# Patient Record
Sex: MALE | Race: WHITE | URBAN - METROPOLITAN AREA
[De-identification: names, ages, dates, MRNs, and addresses within clinical notes are randomized per-mention and may not be internally consistent; named-entity substitution may affect disease eponyms.]

---

## 2019-03-20 ENCOUNTER — OFFICE VISIT (OUTPATIENT)
Dept: FAMILY MEDICINE CLINIC | Age: 84
End: 2019-03-20
Payer: MEDICARE

## 2019-03-20 ENCOUNTER — HOSPITAL ENCOUNTER (OUTPATIENT)
Age: 84
Setting detail: SPECIMEN
Discharge: HOME | End: 2019-03-20
Payer: MEDICARE

## 2019-03-20 VITALS
BODY MASS INDEX: 26.9 KG/M2 | SYSTOLIC BLOOD PRESSURE: 130 MMHG | TEMPERATURE: 97.3 F | HEIGHT: 73 IN | DIASTOLIC BLOOD PRESSURE: 74 MMHG | WEIGHT: 203 LBS | OXYGEN SATURATION: 98 % | HEART RATE: 48 BPM

## 2019-03-20 DIAGNOSIS — G20 PARKINSON'S DISEASE (HCC): ICD-10-CM

## 2019-03-20 DIAGNOSIS — I48.0 PAROXYSMAL ATRIAL FIBRILLATION (HCC): ICD-10-CM

## 2019-03-20 DIAGNOSIS — E78.5 HYPERLIPIDEMIA, UNSPECIFIED HYPERLIPIDEMIA TYPE: ICD-10-CM

## 2019-03-20 DIAGNOSIS — I25.10 CORONARY ARTERY DISEASE INVOLVING NATIVE HEART WITHOUT ANGINA PECTORIS, UNSPECIFIED VESSEL OR LESION TYPE: ICD-10-CM

## 2019-03-20 DIAGNOSIS — Z91.81 AT HIGH RISK FOR FALLS: ICD-10-CM

## 2019-03-20 DIAGNOSIS — G20 PARKINSON'S DISEASE (HCC): Primary | ICD-10-CM

## 2019-03-20 LAB
ABSOLUTE EOS #: 0.31 K/UL (ref 0–0.44)
ABSOLUTE IMMATURE GRANULOCYTE: 0.03 K/UL (ref 0–0.3)
ABSOLUTE LYMPH #: 1.48 K/UL (ref 1.1–3.7)
ABSOLUTE MONO #: 0.63 K/UL (ref 0.1–1.2)
ALBUMIN SERPL-MCNC: 4.4 G/DL (ref 3.5–5.2)
ALBUMIN/GLOBULIN RATIO: 1.6 (ref 1–2.5)
ALP BLD-CCNC: 105 U/L (ref 40–129)
ALT SERPL-CCNC: <5 U/L (ref 5–41)
ANION GAP SERPL CALCULATED.3IONS-SCNC: 14 MMOL/L (ref 9–17)
AST SERPL-CCNC: 16 U/L
BASOPHILS # BLD: 1 % (ref 0–2)
BASOPHILS ABSOLUTE: 0.04 K/UL (ref 0–0.2)
BILIRUB SERPL-MCNC: 0.95 MG/DL (ref 0.3–1.2)
BUN BLDV-MCNC: 22 MG/DL (ref 8–23)
BUN/CREAT BLD: ABNORMAL (ref 9–20)
CALCIUM SERPL-MCNC: 9.2 MG/DL (ref 8.6–10.4)
CHLORIDE BLD-SCNC: 107 MMOL/L (ref 98–107)
CHOLESTEROL/HDL RATIO: 2.6
CHOLESTEROL: 98 MG/DL
CO2: 24 MMOL/L (ref 20–31)
CREAT SERPL-MCNC: 1.16 MG/DL (ref 0.7–1.2)
DIFFERENTIAL TYPE: ABNORMAL
EOSINOPHILS RELATIVE PERCENT: 4 % (ref 1–4)
GFR AFRICAN AMERICAN: >60 ML/MIN
GFR NON-AFRICAN AMERICAN: >60 ML/MIN
GFR SERPL CREATININE-BSD FRML MDRD: ABNORMAL ML/MIN/{1.73_M2}
GFR SERPL CREATININE-BSD FRML MDRD: ABNORMAL ML/MIN/{1.73_M2}
GLUCOSE BLD-MCNC: 89 MG/DL (ref 70–99)
HCT VFR BLD CALC: 45.3 % (ref 40.7–50.3)
HDLC SERPL-MCNC: 38 MG/DL
HEMOGLOBIN: 13.7 G/DL (ref 13–17)
IMMATURE GRANULOCYTES: 0 %
LDL CHOLESTEROL: 48 MG/DL (ref 0–130)
LYMPHOCYTES # BLD: 18 % (ref 24–43)
MCH RBC QN AUTO: 28.4 PG (ref 25.2–33.5)
MCHC RBC AUTO-ENTMCNC: 30.2 G/DL (ref 28.4–34.8)
MCV RBC AUTO: 93.8 FL (ref 82.6–102.9)
MONOCYTES # BLD: 8 % (ref 3–12)
NRBC AUTOMATED: 0 PER 100 WBC
PDW BLD-RTO: 14.7 % (ref 11.8–14.4)
PLATELET # BLD: 182 K/UL (ref 138–453)
PLATELET ESTIMATE: ABNORMAL
PMV BLD AUTO: 11.2 FL (ref 8.1–13.5)
POTASSIUM SERPL-SCNC: 4.7 MMOL/L (ref 3.7–5.3)
RBC # BLD: 4.83 M/UL (ref 4.21–5.77)
RBC # BLD: ABNORMAL 10*6/UL
SEG NEUTROPHILS: 69 % (ref 36–65)
SEGMENTED NEUTROPHILS ABSOLUTE COUNT: 5.8 K/UL (ref 1.5–8.1)
SODIUM BLD-SCNC: 145 MMOL/L (ref 135–144)
TOTAL PROTEIN: 7.2 G/DL (ref 6.4–8.3)
TRIGL SERPL-MCNC: 61 MG/DL
VLDLC SERPL CALC-MCNC: ABNORMAL MG/DL (ref 1–30)
WBC # BLD: 8.3 K/UL (ref 3.5–11.3)
WBC # BLD: ABNORMAL 10*3/UL

## 2019-03-20 PROCEDURE — 99204 OFFICE O/P NEW MOD 45 MIN: CPT | Performed by: PHYSICIAN ASSISTANT

## 2019-03-20 RX ORDER — FLUTICASONE PROPIONATE 50 MCG
2 SPRAY, SUSPENSION (ML) NASAL DAILY
COMMUNITY

## 2019-03-20 RX ORDER — HYDROXYCHLOROQUINE SULFATE 200 MG/1
TABLET, FILM COATED ORAL 2 TIMES DAILY
COMMUNITY
End: 2019-07-11 | Stop reason: SDUPTHER

## 2019-03-20 RX ORDER — ACETAMINOPHEN 325 MG/1
650 TABLET ORAL EVERY 6 HOURS PRN
COMMUNITY

## 2019-03-20 RX ORDER — TRIAMCINOLONE ACETONIDE 0.25 MG/ML
LOTION TOPICAL DAILY
COMMUNITY

## 2019-03-20 RX ORDER — QUETIAPINE FUMARATE 25 MG/1
75 TABLET, FILM COATED ORAL NIGHTLY
COMMUNITY

## 2019-03-20 RX ORDER — FEXOFENADINE HCL 180 MG/1
180 TABLET ORAL DAILY
COMMUNITY

## 2019-03-20 RX ORDER — SIMVASTATIN 20 MG
20 TABLET ORAL NIGHTLY
COMMUNITY
End: 2019-05-01 | Stop reason: SDUPTHER

## 2019-03-20 ASSESSMENT — ENCOUNTER SYMPTOMS
NAUSEA: 0
CONSTIPATION: 0
VOMITING: 0
BACK PAIN: 0
SHORTNESS OF BREATH: 0
ABDOMINAL PAIN: 0
DIARRHEA: 0
COUGH: 0
COLOR CHANGE: 0
WHEEZING: 0

## 2019-03-20 ASSESSMENT — PATIENT HEALTH QUESTIONNAIRE - PHQ9
SUM OF ALL RESPONSES TO PHQ QUESTIONS 1-9: 0
1. LITTLE INTEREST OR PLEASURE IN DOING THINGS: 0
SUM OF ALL RESPONSES TO PHQ9 QUESTIONS 1 & 2: 0
SUM OF ALL RESPONSES TO PHQ QUESTIONS 1-9: 0
2. FEELING DOWN, DEPRESSED OR HOPELESS: 0

## 2019-04-01 RX ORDER — NYSTATIN 100000 [USP'U]/G
POWDER TOPICAL
Qty: 60 G | Refills: 1 | Status: SHIPPED | OUTPATIENT
Start: 2019-04-01

## 2019-04-01 NOTE — TELEPHONE ENCOUNTER
Pt's wife is asking if you can send in a nystatin powder, pt has a rash on his leg.  Please advise thank you

## 2019-04-04 ENCOUNTER — OFFICE VISIT (OUTPATIENT)
Dept: NEUROLOGY | Age: 84
End: 2019-04-04
Payer: MEDICARE

## 2019-04-04 VITALS
SYSTOLIC BLOOD PRESSURE: 119 MMHG | HEIGHT: 73 IN | HEART RATE: 70 BPM | WEIGHT: 200 LBS | BODY MASS INDEX: 26.51 KG/M2 | DIASTOLIC BLOOD PRESSURE: 77 MMHG

## 2019-04-04 DIAGNOSIS — G20 PD (PARKINSON'S DISEASE) (HCC): Primary | ICD-10-CM

## 2019-04-04 DIAGNOSIS — G62.9 PERIPHERAL POLYNEUROPATHY: ICD-10-CM

## 2019-04-04 DIAGNOSIS — R44.3 HALLUCINATION: ICD-10-CM

## 2019-04-04 PROCEDURE — G8599 NO ASA/ANTIPLAT THER USE RNG: HCPCS | Performed by: PSYCHIATRY & NEUROLOGY

## 2019-04-04 PROCEDURE — 99204 OFFICE O/P NEW MOD 45 MIN: CPT | Performed by: PSYCHIATRY & NEUROLOGY

## 2019-04-04 PROCEDURE — G8419 CALC BMI OUT NRM PARAM NOF/U: HCPCS | Performed by: PSYCHIATRY & NEUROLOGY

## 2019-04-04 PROCEDURE — 4040F PNEUMOC VAC/ADMIN/RCVD: CPT | Performed by: PSYCHIATRY & NEUROLOGY

## 2019-04-04 PROCEDURE — G8427 DOCREV CUR MEDS BY ELIG CLIN: HCPCS | Performed by: PSYCHIATRY & NEUROLOGY

## 2019-04-04 PROCEDURE — 1123F ACP DISCUSS/DSCN MKR DOCD: CPT | Performed by: PSYCHIATRY & NEUROLOGY

## 2019-04-04 PROCEDURE — 1036F TOBACCO NON-USER: CPT | Performed by: PSYCHIATRY & NEUROLOGY

## 2019-04-04 NOTE — PROGRESS NOTES
[facility]            [facility], [address]; [place], [address]    [address] [facility], [address], [place], [address]            Dept: [phone]          Dept Fax: [phone]            MD Sophie Paz MD Ahmed B. Edilia Puna, MD Darleen Bernhardt, MD Dorlene Hector, MD Maria De Jesus Prieto, CNP                          NEUROLOGY NEW PATIENT NOTE       PATIENT NAME: Rafael Howard  PATIENT MRN: [id number]  PRIMARY CARE PHYSICIAN: Dr. Layla Sebastian, [facility]  REFERRED BY: Dr. Claude Haines       Dear Dr. Claude Haines    I had the pleasure of seeing your patient Rafael Howard, who comes to establish care with us. CHIEF COMPLAINTS: New Patient and Tremors         HPI:    Rafael Howard is a  80year old RH WM, who was referred to neurology for Parkinsons disease. Pt came with wife to clinic today. PD   Diagnosed about 5 years ago  Onset: 10 years ago  Features: no tremors, originally presented with voice change, gait issues with fall last Nov, stiffness when gets up in bed, hypophonia, tongue \"dancing\", smell is good,   needs assistance with dressing and some ADLs. He has visual (people, animals, bugs) and auditory (shower running) hallucinations, uses seriquel with help (now once in 1-2 months); uses a walker most of the time but wheelchair when outing  Risk factors: mother had PD. No history of head injury. Social:  since for 62 years. pt lives in [place] with wife since [address] [facility], they originally wanted to move permenantly, now wife has been thinking that permenant living here may not good for her  because he has no friend, no interest here. Wife recalls he used to thresh his hand during sleep. No kicking. Psychiatric: no depression/anxiety. Other medical issues: broke his right hip in last Nov after a fall, s/p surgery;   A fib that self converted; CAD with stent; on the plaquenil for? HLD on statin; hearing loss (not taking hearing aid anymore, \"not work\")    Medications: Sinemet 25/100mg (1.5 tablets TID) for the 5 years with some help.      NEUROLOGICAL WORKUP:   No head imaging    OTHER WORKUP:     Lab Results   Component Value Date    WBC 8.3 03/20/2019    HGB 13.7 03/20/2019    HCT 45.3 03/20/2019    MCV 93.8 03/20/2019     03/20/2019       PAST MEDICAL HISTORY:         Diagnosis Date    Allergic rhinitis     Anxiety     CAD (coronary artery disease)     with x 1 stent    Cancer ([facility]     prostate, treated in 2014, treated with radiation    Hallucinations     was treated by neurologist in [address] loss     Hip fracture ([facility] [facility]     Hyperlipidemia     EDGAR (obstructive sleep apnea)     declines use of CPAP    Osteoarthritis     Parkinson disease ([facility] [facility]     2016    Paroxysmal atrial fibrillation ([facility] [facility]     Urinary incontinence         PAST SURGICAL HISTORY:         Procedure Laterality Date    APPENDECTOMY      BACK SURGERY      for pinched nerve    CHOLECYSTECTOMY      COLONOSCOPY      HIP SURGERY Right     KNEE ARTHROPLASTY      partial knees bilaterally    KNEE SURGERY      TOTAL HIP ARTHROPLASTY          SOCIAL HISTORY:     Social History     Socioeconomic History    Marital status:      Spouse name: Not on file    Number of children: Not on file    Years of education: Not on file    Highest education level: Not on file   Occupational History    Not on file   Social Needs    Financial resource strain: Not on file    Food insecurity:     Worry: Not on file     Inability: Not on file    Transportation needs:     Medical: Not on file     Non-medical: Not on file   Tobacco Use    Smoking status: Never Smoker    Smokeless tobacco: Never Used   Substance and Sexual Activity    Alcohol use: Yes     Comment: rare    Drug use: Never    Sexual activity: Not on file   Lifestyle    Physical activity:     Days per week: Not on file     Minutes per session: Not on file    Stress: Not on file   Relationships    Social connections:     Talks on phone: Not on file     Gets together: Not on file     Attends Caoda[employer] service: Not on file     Active member of club or organization: Not on file     Attends meetings of clubs or organizations: Not on file     Relationship status: Not on file    Intimate partner violence:     Fear of current or ex partner: Not on file     Emotionally abused: Not on file     Physically abused: Not on file     Forced sexual activity: Not on file   Other Topics Concern    Not on file   Social History Narrative    Not on file       MEDICATIONS:     Current Outpatient Medications   Medication Sig Dispense Refill    nystatin (MYCOSTATIN) 585137 UNIT/GM powder Apply 3 times daily. 60 g 1    fexofenadine (ALLEGRA ALLERGY) 180 MG tablet Take 180 mg by mouth daily      fluticasone (FLONASE) 50 MCG/ACT nasal spray 2 sprays by Each Nare route daily      Sodium Chloride-Xylitol (XLEAR SINUS CARE SPRAY NA) by Nasal route      triamcinolone (KENALOG) 0.025 % LOTN lotion Apply topically daily      carbidopa-levodopa (SINEMET)  MG per tablet Take 1 tablet by mouth 3 times daily      hydroxychloroquine (PLAQUENIL) 200 MG tablet Take by mouth 2 times daily      simvastatin (ZOCOR) 20 MG tablet Take 20 mg by mouth nightly      acetaminophen (TYLENOL) 325 MG tablet Take 650 mg by mouth every 6 hours as needed for Pain      QUEtiapine (SEROQUEL) 25 MG tablet Take 75 mg by mouth nightly       No current facility-administered medications for this visit.          ALLERGIES:   No Known Allergies      REVIEW OF SYSTEMS:      CONSTITUTIONAL Weight change: absent, Appetite change: absent, Fatigue: absent    HEENT Ears: loss, Visual disturbance: absent    RESPIRATORY Shortness of breath: absent, Cough: absent    CARDIOVASCULAR Chest pain: absent, Leg swelling :present    GI Constipation: absent, Diarrhea: absent, Swallowing change: absent     Urinary frequency: present, Urinary urgency: absent, Urinary incontinence: absent    MUSCULOSKELETAL Neck pain: absent, Back pain: present, Stiffness: absent, Muscle pain: absent, Joint pain: absent Restless legs: absent    DERMATOLOGIC Hair loss: absent, Skin changes: absent    NEUROLOGIC Memory loss: absent, Confusion: present, Seizures: absent Trouble walking or imbalance: present, Dizziness: present, Weakness: present, Numbness: absent Tremor: present, Spasm: present, Speech difficulty: absent, Headache: absent, Light sensitivity: absent    PSYCHIATRIC Anxiety: absent, Hallucination: present, Mood disorder: absent    HEMATOLOGIC Abnormal bleeding: absent, Anemia: absent, Clotting disorder: absent, Lymph gland changes: absent     VITALS /77 (Site: Right Upper Arm, Position: Sitting)   Pulse 70   Ht 6' 1\" (1.854 m)   Wt 200 lb (90.7 kg)   BMI 26.39 kg/m²        PHYSICAL EXAMINATIONS:     General appearance: cooperative  Skin: no rash or skin lesions. HEENT: normocephalic  Optic Fundi: deferred  Neck: supple, no cervcical adenopathy or carotid bruit  Lungs: clear to auscultation  Heart: Regular rate and rhythm, normal S1, S2. No murmurs, clicks or gallops. Peripheral pulses: radial pulses palpable  Abdominal: BS present, soft, NT, ND  Extremities: no edema    NEUROLOGICAL EXAMINATION:     MS: awake, alert and oriented. Hypophonia, no mask face  CNs: PERRLA, EOMI, VF full, sensation intact, face symmetric, hearing intact, soft palate rises on phonation, sternocleidomastoid and trapezius intact. Tongue midline, no fasciculations. Motor: very mild resting tremor on right hand, increased tone in both arms, bulk okay.    RUE: delta 5/5, biceps 5/5, triceps 5/5,  5/5  LUE: delta 5/5, biceps 5/5, triceps 5/5,  5/5  RLE: hf 4/5, ke 4/5, df 4+/5, pf 5/5  LLE: hf 4/5, ke 4/5, df 4+/5, pf 5/5  Reflexes: 2+ in UEs, difficulty to induce in LEs, symmetric, babinski not present. Coordination: FNF no dysmetria, heel to shin okay, DREA okay, negative Rhomberg. Gait: can not walk without assistance, not able to do Tandem. Sensory: stock distribution reduction to temp/pp below knees. no extinction. ASSESSMENT/PLAN:     // PD  - family history, no tremors  - continue sinemet at 25/100mg 1.5 tablets TID  - fall precaution  - follow with neurologist at [facility] when goes back to [facility]  - practice Zane-Qi, or bicycling with assistance at side  - discussed with wife of caregiver's care, encouraged them to find PD supporting group when they return to [facility]    // Hallucination  - relatively well controlled with seroquel     // peripheral polyneuropathy  - no history of DM or heavy drinking, unclear etiology      RTC PRN      Thank you for referring Mr. Wolff to me, shall you have any questions, please do not hesitate to let me know. Thank you.     Sincerely,    Silver Jay MD, MS

## 2019-05-01 ENCOUNTER — OFFICE VISIT (OUTPATIENT)
Dept: FAMILY MEDICINE CLINIC | Age: 84
End: 2019-05-01
Payer: MEDICARE

## 2019-05-01 VITALS
DIASTOLIC BLOOD PRESSURE: 70 MMHG | OXYGEN SATURATION: 99 % | BODY MASS INDEX: 26.51 KG/M2 | HEART RATE: 59 BPM | HEIGHT: 73 IN | SYSTOLIC BLOOD PRESSURE: 118 MMHG | WEIGHT: 200 LBS

## 2019-05-01 DIAGNOSIS — G20 PARKINSON'S DISEASE (HCC): Primary | ICD-10-CM

## 2019-05-01 DIAGNOSIS — E78.5 HYPERLIPIDEMIA, UNSPECIFIED HYPERLIPIDEMIA TYPE: ICD-10-CM

## 2019-05-01 PROCEDURE — G8419 CALC BMI OUT NRM PARAM NOF/U: HCPCS | Performed by: PHYSICIAN ASSISTANT

## 2019-05-01 PROCEDURE — G8427 DOCREV CUR MEDS BY ELIG CLIN: HCPCS | Performed by: PHYSICIAN ASSISTANT

## 2019-05-01 PROCEDURE — 1036F TOBACCO NON-USER: CPT | Performed by: PHYSICIAN ASSISTANT

## 2019-05-01 PROCEDURE — 99213 OFFICE O/P EST LOW 20 MIN: CPT | Performed by: PHYSICIAN ASSISTANT

## 2019-05-01 PROCEDURE — 4040F PNEUMOC VAC/ADMIN/RCVD: CPT | Performed by: PHYSICIAN ASSISTANT

## 2019-05-01 PROCEDURE — G8599 NO ASA/ANTIPLAT THER USE RNG: HCPCS | Performed by: PHYSICIAN ASSISTANT

## 2019-05-01 PROCEDURE — 1123F ACP DISCUSS/DSCN MKR DOCD: CPT | Performed by: PHYSICIAN ASSISTANT

## 2019-05-01 RX ORDER — SIMVASTATIN 20 MG
20 TABLET ORAL NIGHTLY
Qty: 90 TABLET | Refills: 3 | Status: SHIPPED | OUTPATIENT
Start: 2019-05-01

## 2019-05-01 ASSESSMENT — ENCOUNTER SYMPTOMS
DIARRHEA: 0
NAUSEA: 0
VOMITING: 0
COUGH: 0
COLOR CHANGE: 0
SHORTNESS OF BREATH: 0
CONSTIPATION: 0
WHEEZING: 0
ABDOMINAL PAIN: 0

## 2019-05-01 NOTE — PROGRESS NOTES
Procedure Laterality Date    APPENDECTOMY      BACK SURGERY      for pinched nerve    CHOLECYSTECTOMY      COLONOSCOPY      HIP SURGERY Right     KNEE ARTHROPLASTY      partial knees bilaterally    KNEE SURGERY      TOTAL HIP ARTHROPLASTY         Family History   Problem Relation Age of Onset    Arthritis Mother     Other Mother         parkinsons    Cancer Father         prostate    Other Other         obesity    High Blood Pressure Other           Social History     Tobacco Use    Smoking status: Never Smoker    Smokeless tobacco: Never Used   Substance Use Topics    Alcohol use: Yes     Comment: rare         Current Outpatient Medications   Medication Sig Dispense Refill    simvastatin (ZOCOR) 20 MG tablet Take 1 tablet by mouth nightly 90 tablet 3    nystatin (MYCOSTATIN) 585394 UNIT/GM powder Apply 3 times daily. 60 g 1    fexofenadine (ALLEGRA ALLERGY) 180 MG tablet Take 180 mg by mouth daily      fluticasone (FLONASE) 50 MCG/ACT nasal spray 2 sprays by Each Nare route daily      Sodium Chloride-Xylitol (XLEAR SINUS CARE SPRAY NA) by Nasal route      triamcinolone (KENALOG) 0.025 % LOTN lotion Apply topically daily      carbidopa-levodopa (SINEMET)  MG per tablet Take 1 tablet by mouth 3 times daily      hydroxychloroquine (PLAQUENIL) 200 MG tablet Take by mouth 2 times daily      acetaminophen (TYLENOL) 325 MG tablet Take 650 mg by mouth every 6 hours as needed for Pain      QUEtiapine (SEROQUEL) 25 MG tablet Take 75 mg by mouth nightly       No current facility-administered medications for this visit.       No Known Allergies    Health Maintenance   Topic Date Due    DTaP/Tdap/Td vaccine (1 - Tdap) 09[birth year]21/1954    Shingles Vaccine (1 of 2) 09/21/1985    Pneumococcal 65+ years Vaccine (1 of 2 - PCV13) 09/21/2000    Flu vaccine (Season Ended) 09/01/2019       Subjective:     Review of Systems   Constitutional: Negative for activity change, appetite change, fatigue, fever and unexpected weight change. /70   Pulse 59   Ht 6' 1\" (1.854 m)   Wt 200 lb (90.7 kg)   SpO2 99%   BMI 26.39 kg/m²    Respiratory: Negative for cough, shortness of breath and wheezing. Cardiovascular: Negative for chest pain and palpitations. Gastrointestinal: Negative for abdominal pain, constipation, diarrhea, nausea and vomiting. Skin: Negative for color change, pallor, rash and wound. Neurological: Positive for tremors. Hematological: Negative for adenopathy. Psychiatric/Behavioral: Negative for sleep disturbance. The patient is not nervous/anxious. Objective:     Physical Exam   Constitutional: He is oriented to person, place, and time. He appears well-developed and well-nourished. HENT:   Head: Normocephalic and atraumatic. Cardiovascular: Normal rate, regular rhythm and normal heart sounds. Pulmonary/Chest: Effort normal and breath sounds normal. No respiratory distress. He has no wheezes. He has no rales. Neurological: He is alert and oriented to person, place, and time. Walking well with walker. Skin: Skin is warm and dry. No rash noted. No erythema. No pallor. Psychiatric: He has a normal mood and affect. His behavior is normal. Judgment and thought content normal.   Nursing note and vitals reviewed. /70   Pulse 59   Ht 6' 1\" (1.854 m)   Wt 200 lb (90.7 kg)   SpO2 99%   BMI 26.39 kg/m²     Assessment:          Diagnosis Orders   1. Parkinson's disease ([facility] [address]     2. Hyperlipidemia, unspecified hyperlipidemia type               Plan:      Return if symptoms worsen or fail to improve.     Patient appears to be doing well  He will continue on present medications  Reviewed recent labs  Refill on zocor  Recommended recheck in 6 mo but likely they will back in [place] by then  Encouraged apt here if they any concerns bf there move  Pt/wife agreed with treatment plan     Orders Placed This Encounter   Medications    simvastatin (ZOCOR) 20 MG tablet     Sig: Take 1 tablet by mouth nightly     Dispense:  90 tablet     Refill:  3      Lab Results   Component Value Date    WBC 8.3 03/20/2019    HGB 13.7 03/20/2019    HCT 45.3 03/20/2019    MCV 93.8 03/20/2019     03/20/2019     Lab Results   Component Value Date     (H) 03/20/2019    K 4.7 03/20/2019     03/20/2019    CO2 24 03/20/2019    BUN 22 03/20/2019    CREATININE 1.16 03/20/2019    GLUCOSE 89 03/20/2019    CALCIUM 9.2 03/20/2019    PROT 7.2 03/20/2019    LABALBU 4.4 03/20/2019    BILITOT 0.95 03/20/2019    ALKPHOS 105 03/20/2019    AST 16 03/20/2019    ALT <5 (L) 03/20/2019    LABGLOM >60 03/20/2019    GFRAA >60 03/20/2019       Lab Results   Component Value Date    CHOL 98 03/20/2019     Lab Results   Component Value Date    TRIG 61 03/20/2019     Lab Results   Component Value Date    HDL 38 (L) 03/20/2019     Lab Results   Component Value Date    LDLCHOLESTEROL 48 03/20/2019     Lab Results   Component Value Date    VLDL NOT REPORTED 03/20/2019     Lab Results   Component Value Date    CHOLHDLRATIO 2.6 03/20/2019     No results found for: LABA1C  No results found for: EAG     Patient given educational materials - see patient instructions. Discussed use, benefit, and side effects of prescribed medications. All patientquestions answered. Pt voiced understanding. Reviewed health maintenance. Instructedto continue current medications, diet and exercise. Patient agreed with treatmentplan. Follow up as directed.      Electronically signed by Deisi Mckenzie PA-C on 5/1/2019 at 10:46 AM

## 2019-05-14 ENCOUNTER — OFFICE VISIT (OUTPATIENT)
Dept: FAMILY MEDICINE CLINIC | Age: 84
End: 2019-05-14
Payer: MEDICARE

## 2019-05-14 VITALS
DIASTOLIC BLOOD PRESSURE: 70 MMHG | SYSTOLIC BLOOD PRESSURE: 118 MMHG | BODY MASS INDEX: 26.51 KG/M2 | HEIGHT: 73 IN | WEIGHT: 200 LBS

## 2019-05-14 DIAGNOSIS — H61.23 BILATERAL IMPACTED CERUMEN: Primary | ICD-10-CM

## 2019-05-14 PROCEDURE — G8419 CALC BMI OUT NRM PARAM NOF/U: HCPCS | Performed by: PHYSICIAN ASSISTANT

## 2019-05-14 PROCEDURE — 99213 OFFICE O/P EST LOW 20 MIN: CPT | Performed by: PHYSICIAN ASSISTANT

## 2019-05-14 PROCEDURE — 1123F ACP DISCUSS/DSCN MKR DOCD: CPT | Performed by: PHYSICIAN ASSISTANT

## 2019-05-14 PROCEDURE — 4040F PNEUMOC VAC/ADMIN/RCVD: CPT | Performed by: PHYSICIAN ASSISTANT

## 2019-05-14 PROCEDURE — G8599 NO ASA/ANTIPLAT THER USE RNG: HCPCS | Performed by: PHYSICIAN ASSISTANT

## 2019-05-14 PROCEDURE — G8427 DOCREV CUR MEDS BY ELIG CLIN: HCPCS | Performed by: PHYSICIAN ASSISTANT

## 2019-05-14 PROCEDURE — 1036F TOBACCO NON-USER: CPT | Performed by: PHYSICIAN ASSISTANT

## 2019-05-14 NOTE — PROGRESS NOTES
[address] [facility] [address]  Dept: [phone]  Dept Fax: [phone]     Jerrica Moody is a 80 y.o. male who presents today for his medical conditions/complaintsas noted below. Jerrica Moody is c/o of   Chief Complaint   Patient presents with    Cerumen Impaction     Patient having trouble with ears. HPI:      HPI    Patient here reporting concern for ears with cerumen impaction  Per patient he had these flushed in the past and was told not to have that done on the left due to ruptured membraine  He is here with his wife who reports they are trying to get new hearing aids and he needs the wax gone. He states feeling well.  No pain    No results found for: LABA1C          ( goal A1Cis < 7)   No results found for: LABMICR  LDL Cholesterol (mg/dL)   Date Value   03/20/2019 48       (goal LDL is <100)   AST (U/L)   Date Value   03/20/2019 16     ALT (U/L)   Date Value   03/20/2019 <5 (L)     BUN (mg/dL)   Date Value   03/20/2019 22     BP Readings from Last 3 Encounters:   05/14/19 118/70   05/01/19 118/70   04/04/19 119/77          (goal 120/80)    Past Medical History:   Diagnosis Date    Allergic rhinitis     Anxiety     CAD (coronary artery disease)     with x 1 stent    Cancer ([address]     prostate, treated in 2014, treated with radiation    Hallucinations     was treated by neurologist in [address] loss     Hip fracture ([facility] [address]     Hyperlipidemia     EDGAR (obstructive sleep apnea)     declines use of CPAP    Osteoarthritis     Parkinson disease ([address]     2016    Paroxysmal atrial fibrillation (HCC)     Urinary incontinence       Past Surgical History:   Procedure Laterality Date    APPENDECTOMY      BACK SURGERY      for pinched nerve    CHOLECYSTECTOMY      COLONOSCOPY      HIP SURGERY Right     KNEE ARTHROPLASTY      partial knees bilaterally    KNEE SURGERY      TOTAL HIP ARTHROPLASTY Family History   Problem Relation Age of Onset    Arthritis Mother     Other Mother         parkinsons    Cancer Father         prostate    Other Other         obesity    High Blood Pressure Other           Social History     Tobacco Use    Smoking status: Never Smoker    Smokeless tobacco: Never Used   Substance Use Topics    Alcohol use: Yes     Comment: rare         Current Outpatient Medications   Medication Sig Dispense Refill    simvastatin (ZOCOR) 20 MG tablet Take 1 tablet by mouth nightly 90 tablet 3    nystatin (MYCOSTATIN) 680160 UNIT/GM powder Apply 3 times daily. 60 g 1    fexofenadine (ALLEGRA ALLERGY) 180 MG tablet Take 180 mg by mouth daily      fluticasone (FLONASE) 50 MCG/ACT nasal spray 2 sprays by Each Nare route daily      Sodium Chloride-Xylitol (XLEAR SINUS CARE SPRAY NA) by Nasal route      triamcinolone (KENALOG) 0.025 % LOTN lotion Apply topically daily      carbidopa-levodopa (SINEMET)  MG per tablet Take 1 tablet by mouth 3 times daily      hydroxychloroquine (PLAQUENIL) 200 MG tablet Take by mouth 2 times daily      acetaminophen (TYLENOL) 325 MG tablet Take 650 mg by mouth every 6 hours as needed for Pain      QUEtiapine (SEROQUEL) 25 MG tablet Take 75 mg by mouth nightly       No current facility-administered medications for this visit. No Known Allergies    Health Maintenance   Topic Date Due    DTaP/Tdap/Td vaccine (1 - Tdap) 09[birth year]21/1954    Shingles Vaccine (1 of 2) 09/21/1985    Pneumococcal 65+ years Vaccine (1 of 2 - PCV13) 09/21/2000    Flu vaccine (Season Ended) 09/01/2019       Subjective:     Review of Systems   Constitutional: Negative for activity change, appetite change, chills, diaphoresis, fatigue and fever. HENT: Positive for hearing loss. Negative for ear discharge and ear pain. Skin: Negative for rash. Neurological: Positive for weakness. Hematological: Negative for adenopathy.    Psychiatric/Behavioral: Negative for sleep disturbance. Objective:     Physical Exam   Constitutional: He is oriented to person, place, and time. He appears well-developed and well-nourished. HENT:   Head: Normocephalic and atraumatic. Right Ear: External ear normal.   Left Ear: External ear normal.   Ears:    Neck: Neck supple. Lymphadenopathy:     He has no cervical adenopathy. Neurological: He is alert and oriented to person, place, and time. No cranial nerve deficit. Coordination normal.   Walking with rolling walker   Skin: Skin is warm and dry. No rash noted. No erythema. No pallor. Psychiatric: He has a normal mood and affect. His behavior is normal. Judgment and thought content normal.   Nursing note and vitals reviewed. /70   Ht 6' 1\" (1.854 m)   Wt 200 lb (90.7 kg)   BMI 26.39 kg/m²     Assessment:          Diagnosis Orders   1. Bilateral impacted cerumen  External Referral To ENT             Plan:      Return if symptoms worsen or fail to improve. Hold off on any water flushing at this point due to patient self reported hx of ruptured TM. Referral arranged to Dr. Schuyler Baires to eval further  I called there office myself and got him an appointment for Monday  Patient/wife agreed with treatment plan    Orders Placed This Encounter   Procedures    External Referral To ENT     Referral Priority:   Routine     Referral Type:   Eval and Treat     Referral Reason:   Specialty Services Required     Referred to Provider:   Henrry Bautista MD     Requested Specialty:   Otolaryngology     Number of Visits Requested:   1          Patient given educational materials - see patient instructions. Discussed use, benefit, and side effects of prescribed medications. All patientquestions answered. Pt voiced understanding. Reviewed health maintenance. Instructedto continue current medications, diet and exercise. Patient agreed with treatmentplan. Follow up as directed.      Electronically signed by Vandana Payton PA-C on 5/14/2019 at 10:20 AM

## 2019-05-14 NOTE — PROGRESS NOTES
Visit Information    Have you changed or started any medications since your last visit including any over-the-counter medicines, vitamins, or herbal medicines? no   Have you stopped taking any of your medications? Is so, why? -  no  Are you having any side effects from any of your medications? - no    Have you seen any other physician or provider since your last visit?  no   Have you had any other diagnostic tests since your last visit?  no   Have you been seen in the emergency room and/or had an admission in a hospital since we last saw you?  no   Have you had your routine dental cleaning in the past 6 months?  no     Do you have an active MyChart account? If no, what is the barrier? Yes    Patient Care Team:  Echo Sanders PA-C as PCP - General (Physician Assistant)    Medical History Review  Past Medical, Family, and Social History reviewed and does contribute to the patient presenting condition    Health Maintenance   Topic Date Due    DTaP/Tdap/Td vaccine (1 - Tdap) 09/21/1954    Shingles Vaccine (1 of 2) 09/21/1985    Pneumococcal 65+ years Vaccine (1 of 2 - PCV13) 09/21/2000    Flu vaccine (Season Ended) 09/01/2019           Visit Information    Have you changed or started any medications since your last visit including any over-the-counter medicines, vitamins, or herbal medicines? no   Have you stopped taking any of your medications? Is so, why? -  no  Are you having any side effects from any of your medications? - no    Have you seen any other physician or provider since your last visit?  no   Have you had any other diagnostic tests since your last visit?  no   Have you been seen in the emergency room and/or had an admission in a hospital since we last saw you?  no   Have you had your routine dental cleaning in the past 6 months?  no     Do you have an active MyChart account? If no, what is the barrier?   Yes    Patient Care Team:  Echo Sanders PA-C as PCP - General (Physician Assistant)    Medical History Review  Past Medical, Family, and Social History reviewed and does contribute to the patient presenting condition    Health Maintenance   Topic Date Due    DTaP/Tdap/Td vaccine (1 - Tdap) 09/21/1954    Shingles Vaccine (1 of 2) 09/21/1985    Pneumococcal 65+ years Vaccine (1 of 2 - PCV13) 09/21/2000    Flu vaccine (Season Ended) 09/01/2019

## 2019-07-11 ENCOUNTER — TELEPHONE (OUTPATIENT)
Dept: FAMILY MEDICINE CLINIC | Age: 84
End: 2019-07-11

## 2019-07-11 RX ORDER — HYDROXYCHLOROQUINE SULFATE 200 MG/1
200 TABLET, FILM COATED ORAL 2 TIMES DAILY
Qty: 60 TABLET | Refills: 5 | Status: SHIPPED | OUTPATIENT
Start: 2019-07-11 | End: 2019-07-16 | Stop reason: SDUPTHER

## 2019-07-11 RX ORDER — HYDROXYCHLOROQUINE SULFATE 200 MG/1
TABLET, FILM COATED ORAL 2 TIMES DAILY
Status: CANCELLED | OUTPATIENT
Start: 2019-07-11

## 2019-07-16 RX ORDER — HYDROXYCHLOROQUINE SULFATE 200 MG/1
200 TABLET, FILM COATED ORAL 2 TIMES DAILY
Qty: 180 TABLET | Refills: 1 | Status: SHIPPED | OUTPATIENT
Start: 2019-07-16 | End: 2019-12-20